# Patient Record
(demographics unavailable — no encounter records)

---

## 2024-10-21 NOTE — HISTORY OF PRESENT ILLNESS
[de-identified] : worsening cough, difficulty of sleep [FreeTextEntry6] : - p/w 5 days of cough worsening to date, a/w congestion, headache, yellow mucous with blood streak - cough is worse at night - reported that he still has a lot of energy, he went to swimming though mom did not recommend - denies fever emesis diarrhea skin rash - as per mother, his last antibiotic use in april 2024 for strep

## 2024-10-21 NOTE — DISCUSSION/SUMMARY
[FreeTextEntry1] : # sinusitis, # evolving AOM, # post nasal drip - placed on 5 days of AZM - will follow RVP and throat culture - supportive care reviewed: hydration, humidifier, honey, gentle sufficient blowing nose, antipyretic as needed, antihistamine trial, etc.

## 2024-10-21 NOTE — HISTORY OF PRESENT ILLNESS
[de-identified] : worsening cough, difficulty of sleep [FreeTextEntry6] : - p/w 5 days of cough worsening to date, a/w congestion, headache, yellow mucous with blood streak - cough is worse at night - reported that he still has a lot of energy, he went to swimming though mom did not recommend - denies fever emesis diarrhea skin rash - as per mother, his last antibiotic use in april 2024 for strep

## 2024-10-21 NOTE — PHYSICAL EXAM
[Capillary Refill <2s] : capillary refill < 2s [NL] : warm, clear [Clear] : right tympanic membrane clear [Erythema] : erythema [Clear Effusion] : clear effusion [Bulging] : not bulging [Retracted] : not retracted [Perforated] : not perforated [Enlarged Tonsils] : enlarged tonsils [FreeTextEntry4] : no sinus tenderness appreciated

## 2025-02-06 NOTE — HISTORY OF PRESENT ILLNESS
[de-identified] : cough [FreeTextEntry6] : - since yesterday, p/w emesis, cough, congested, low energy, loose stool - can not walk a long distance as well as normal due to tight breathing - denies fever - brother had flu A and strep 2 weeks ago

## 2025-02-06 NOTE — HISTORY OF PRESENT ILLNESS
[de-identified] : cough [FreeTextEntry6] : - since yesterday, p/w emesis, cough, congested, low energy, loose stool - can not walk a long distance as well as normal due to tight breathing - denies fever - brother had flu A and strep 2 weeks ago

## 2025-02-06 NOTE — PHYSICAL EXAM
[Alert] : alert [Tired appearing] : tired appearing [Clear Rhinorrhea] : clear rhinorrhea [Mucoid Discharge] : mucoid discharge [Erythematous Oropharynx] : erythematous oropharynx [Wheezing] : wheezing [Capillary Refill <2s] : capillary refill < 2s [NL] : warm, clear [de-identified] : PND

## 2025-02-06 NOTE — PHYSICAL EXAM
[Alert] : alert [Tired appearing] : tired appearing [Clear Rhinorrhea] : clear rhinorrhea [Mucoid Discharge] : mucoid discharge [Erythematous Oropharynx] : erythematous oropharynx [Wheezing] : wheezing [Capillary Refill <2s] : capillary refill < 2s [NL] : warm, clear [de-identified] : PND

## 2025-02-06 NOTE — DISCUSSION/SUMMARY
[FreeTextEntry1] : # flu A - placed on oseltamivir for 5 days - co-infection: strep negative in the office; throat culture will be sent out - supportive care advised: humidifier, hydration, honey if 1 year and older, positioning. etc.  - RTC for persistent high fever (longer than 5 days), breathing difficulty, poor oral intake, decreased UOP, or any new concerns/symptoms # breathing difficulty, # wheezing, # LRI - placed on amoxicillin for 10 days - albuterol BID-TID for 1 week